# Patient Record
Sex: FEMALE | Race: WHITE | Employment: UNEMPLOYED | URBAN - METROPOLITAN AREA
[De-identification: names, ages, dates, MRNs, and addresses within clinical notes are randomized per-mention and may not be internally consistent; named-entity substitution may affect disease eponyms.]

---
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
## 2020-01-03 NOTE — PROCEDURES
[address]   EEG    Name:  Aniket Khan  MR#:  [id number]  :  2000  ACCOUNT #:  [de-identified]  DATE OF SERVICE:  2019      24-HOUR EEG    INTRODUCTION:  This is a 16-channel prolonged ambulatory outpatient recording. The recording was initiated at 2019 at 09:18:18 and was discontinued on 2019 at 08:19:27.  23 hours 1 minute and 9 seconds of recording time was obtained representing 8287 epochs of EEG activity (10 seconds per epoch). The EEG was interpreted utilizing epoch by epoch visual analysis. Additionally, computer software to identify spikes and rhythmic discharges was utilized in review and analysis of the recording. DESCRIPTION OF RECORDING:  When the patient is awake in the record, 11-12 Hz 15-30 microvolt alpha frequency activity is seen posteriorly bilaterally. In the more anterior derivations, lower amplitude 14-26 Hz beta activity is seen and is at times mixed with slower rhythms. When the patient is drowsy, there is dropout of the dominant posterior rhythm with increased symmetrical slowing in the EEG background. Vertex transients are seen in light sleep. Sleep spindles and K-complexes appear in stage II of sleep. In slow-wave sleep, there is symmetrical increase in higher amplitude 2-3 Hz delta activity. Spindle activity persists in slow-wave sleep. No focal lateralizing or epileptiform discharges are identified by visual analysis. COMPUTER AUGMENTED ANALYSIS:  Computer software to identify spikes and rhythmic discharges identified multiple possible abnormalities. None, however, could be confirmed by visual analysis. CORRELATION WITH CLINICAL EVENTS:  The patient diary was reviewed. Diary contained normal activities during the recording. No diary entries suggested seizures.     INTERPRETATION:  This 16-channel prolonged ambulatory outpatient recording lasting 23 hours and 1 minute is normal.      Robbert Shone, MD [id number]  D:  01/02/2020 19:20  T:  01/03/2020 4:36  JOB #:  [id number]